# Patient Record
Sex: FEMALE | Race: WHITE | Employment: STUDENT | URBAN - METROPOLITAN AREA
[De-identification: names, ages, dates, MRNs, and addresses within clinical notes are randomized per-mention and may not be internally consistent; named-entity substitution may affect disease eponyms.]

---

## 2017-01-23 ENCOUNTER — TELEPHONE (OUTPATIENT)
Dept: INTERNAL MEDICINE CLINIC | Facility: CLINIC | Age: 27
End: 2017-01-23

## 2017-01-23 NOTE — TELEPHONE ENCOUNTER
Pt called in stating her insurance company is requesting office notes from her visit on 8/8. Pt states she needs the documents including tax ID, diagnosis, etc.  Pt is asking if they can be mailed to her since she is away at school in [facility].   Mailing a

## 2017-05-16 ENCOUNTER — TELEPHONE (OUTPATIENT)
Dept: INTERNAL MEDICINE CLINIC | Facility: CLINIC | Age: 27
End: 2017-05-16

## 2017-05-16 NOTE — TELEPHONE ENCOUNTER
Pt states Dr Nereida Hdez wanted her to have labs done before her next prescription refills  Is out of [place]- wont be home until Aug    Questioning if can get lab orders to be done in [place]?   If yes will provide lab information on callback

## 2017-05-16 NOTE — TELEPHONE ENCOUNTER
TANYA- pt has labs for [facility] in the system from Dec 2016-states she was unable to get them done as she lives out of state in [facility]. She will be back in August for a FU appt with you.  Can she have the labs done out of state ( faxed from our office ) or sh

## 2017-05-18 NOTE — TELEPHONE ENCOUNTER
Message was relayed on voice mail that labs can be gotten out of state. Pt was asked to return call if order of labs need to be faxed or mailed and to bring a copy of test results with her upon her return to see PCP.

## 2017-05-22 NOTE — TELEPHONE ENCOUNTER
Pt returning phone call from the nurse. Pt requesting to have the order mailed to her home. .. please advise

## 2017-06-28 RX ORDER — CLONAZEPAM 0.5 MG/1
TABLET ORAL
Qty: 30 TABLET | Refills: 2 | Status: SHIPPED | OUTPATIENT
Start: 2017-06-28 | End: 2017-08-11

## 2017-06-28 RX ORDER — ZOLPIDEM TARTRATE 5 MG/1
TABLET ORAL
Qty: 30 TABLET | Refills: 2 | Status: SHIPPED | OUTPATIENT
Start: 2017-06-28 | End: 2017-08-11

## 2017-06-29 RX ORDER — ZOLPIDEM TARTRATE 5 MG/1
TABLET ORAL
Qty: 30 TABLET | OUTPATIENT
Start: 2017-06-29

## 2017-06-29 RX ORDER — CLONAZEPAM 0.5 MG/1
TABLET ORAL
Qty: 30 TABLET | OUTPATIENT
Start: 2017-06-29

## 2017-08-01 ENCOUNTER — TELEPHONE (OUTPATIENT)
Dept: INTERNAL MEDICINE CLINIC | Facility: CLINIC | Age: 27
End: 2017-08-01

## 2017-08-01 NOTE — TELEPHONE ENCOUNTER
Pt stts she will be out of medication by the 7th but pharmacy will not dispense medication until the 8 th of Aug,     Pt asking for an early refill. Please advise.        ZOLPIDEM TARTRATE 5 MG Oral Tab

## 2017-08-02 NOTE — TELEPHONE ENCOUNTER
Pt is filling rx for Zolpidem in [facility] in [place], I called to clarify. Pt last picked up rx on July 12th for 30 tablets.           Disp Refills Start End    ZOLPIDEM TARTRATE 5 MG Oral Tab 30 tablet 2 6/28/2017     Sig: TAKE 1 TABLET BY MOUTH IN THE AT BED

## 2017-08-02 NOTE — TELEPHONE ENCOUNTER
Pt called back and stated that she does not need zolpidem 5 mg. She stated that she was seeing the label wrong. The pharmacy filled the Rx on 7/8 but she picked up on 7/12. Pt stated that she does not need this medication. She will have enough. Thanks

## 2017-08-11 ENCOUNTER — LAB ENCOUNTER (OUTPATIENT)
Dept: LAB | Facility: HOSPITAL | Age: 27
End: 2017-08-11
Attending: INTERNAL MEDICINE
Payer: COMMERCIAL

## 2017-08-11 DIAGNOSIS — G47.00 INSOMNIA, UNSPECIFIED TYPE: ICD-10-CM

## 2017-08-11 DIAGNOSIS — F41.1 ANXIETY STATE: ICD-10-CM

## 2017-08-11 LAB
ALBUMIN SERPL BCP-MCNC: 4.4 G/DL (ref 3.5–4.8)
ALBUMIN/GLOB SERPL: 1.7 {RATIO} (ref 1–2)
ALP SERPL-CCNC: 33 U/L (ref 32–100)
ALT SERPL-CCNC: 13 U/L (ref 14–54)
ANION GAP SERPL CALC-SCNC: 7 MMOL/L (ref 0–18)
AST SERPL-CCNC: 20 U/L (ref 15–41)
BASOPHILS # BLD: 0.1 K/UL (ref 0–0.2)
BASOPHILS NFR BLD: 1 %
BILIRUB SERPL-MCNC: 0.8 MG/DL (ref 0.3–1.2)
BUN SERPL-MCNC: 9 MG/DL (ref 8–20)
BUN/CREAT SERPL: 10.5 (ref 10–20)
CALCIUM SERPL-MCNC: 9.3 MG/DL (ref 8.5–10.5)
CHLORIDE SERPL-SCNC: 106 MMOL/L (ref 95–110)
CO2 SERPL-SCNC: 25 MMOL/L (ref 22–32)
CREAT SERPL-MCNC: 0.86 MG/DL (ref 0.5–1.5)
EOSINOPHIL # BLD: 0.1 K/UL (ref 0–0.7)
EOSINOPHIL NFR BLD: 2 %
ERYTHROCYTE [DISTWIDTH] IN BLOOD BY AUTOMATED COUNT: 12.8 % (ref 11–15)
GLOBULIN PLAS-MCNC: 2.6 G/DL (ref 2.5–3.7)
GLUCOSE SERPL-MCNC: 97 MG/DL (ref 70–99)
HCT VFR BLD AUTO: 41.9 % (ref 35–48)
HGB BLD-MCNC: 14.5 G/DL (ref 12–16)
LYMPHOCYTES # BLD: 2.4 K/UL (ref 1–4)
LYMPHOCYTES NFR BLD: 35 %
MCH RBC QN AUTO: 30.9 PG (ref 27–32)
MCHC RBC AUTO-ENTMCNC: 34.7 G/DL (ref 32–37)
MCV RBC AUTO: 89.1 FL (ref 80–100)
MONOCYTES # BLD: 0.7 K/UL (ref 0–1)
MONOCYTES NFR BLD: 10 %
NEUTROPHILS # BLD AUTO: 3.5 K/UL (ref 1.8–7.7)
NEUTROPHILS NFR BLD: 51 %
OSMOLALITY UR CALC.SUM OF ELEC: 285 MOSM/KG (ref 275–295)
PLATELET # BLD AUTO: 310 K/UL (ref 140–400)
PMV BLD AUTO: 7.8 FL (ref 7.4–10.3)
POTASSIUM SERPL-SCNC: 3.7 MMOL/L (ref 3.3–5.1)
PROT SERPL-MCNC: 7 G/DL (ref 5.9–8.4)
RBC # BLD AUTO: 4.7 M/UL (ref 3.7–5.4)
SODIUM SERPL-SCNC: 138 MMOL/L (ref 136–144)
TSH SERPL-ACNC: 1.21 UIU/ML (ref 0.45–5.33)
WBC # BLD AUTO: 6.9 K/UL (ref 4–11)

## 2017-08-11 PROCEDURE — 80053 COMPREHEN METABOLIC PANEL: CPT

## 2017-08-11 PROCEDURE — 84443 ASSAY THYROID STIM HORMONE: CPT

## 2017-08-11 PROCEDURE — 36415 COLL VENOUS BLD VENIPUNCTURE: CPT

## 2017-08-11 PROCEDURE — 85025 COMPLETE CBC W/AUTO DIFF WBC: CPT

## 2017-08-11 NOTE — ASSESSMENT & PLAN NOTE
Severe anxiety disorder, stable on clonazepam at 0.5 mg 1 tablet at bedtime. She has not had any increase in the dose of medications thus far. Continue on the same dose of medications as well have patient follow-up every 6 months.   She is due for recheck

## 2017-08-11 NOTE — PATIENT INSTRUCTIONS
Problem List Items Addressed This Visit        Unprioritized    Anxiety state - Primary     Severe anxiety disorder, stable on clonazepam at 0.5 mg 1 tablet at bedtime. She has not had any increase in the dose of medications thus far.   Continue on the gayathri

## 2017-08-11 NOTE — PROGRESS NOTES
HPI:    Patient ID: Dinorah Younger is a 28year old female. Anxiety   This is a chronic problem. The current episode started more than 1 year ago. The problem occurs constantly. The problem has been waxing and waning.  Pertinent negatives include no a dry.   Nursing note and vitals reviewed. ASSESSMENT/PLAN:     Problem List Items Addressed This Visit        Unprioritized    Anxiety state - Primary     Severe anxiety disorder, stable on clonazepam at 0.5 mg 1 tablet at bedtime.   She has not BEDTIME AS NEEDED      Norethin Ace-Eth Estrad-FE (JUNEL FE 1/20) 1-20 MG-MCG Oral Tab 28 tablet 5      Sig: Take 1 tablet by mouth once daily.       Zolpidem Tartrate 5 MG Oral Tab 30 tablet 5      Sig: TAKE 1 TABLET BY MOUTH IN THE AT BEDTIME           Im

## 2017-08-29 ENCOUNTER — NURSE TRIAGE (OUTPATIENT)
Dept: OTHER | Age: 27
End: 2017-08-29

## 2017-08-29 NOTE — TELEPHONE ENCOUNTER
Reason for Disposition  • Insomnia interferes with work or school     Pt states that she has been on medication for insomnia for the past 5 years. Over the past 3 weeks (since LOV) but has had increasing difficulty staying asleep.   She has been taking m

## 2017-08-29 NOTE — TELEPHONE ENCOUNTER
I understand that the patient has problems with sleep and anxiety. I am not comfortable with increasing doses of medication at this time. Consider nonpharmacological means for management of insomnia and anxiety.   Continue on the same dose of clonazepam a

## 2017-08-30 NOTE — TELEPHONE ENCOUNTER
Message per Dr. Bell Simple reviewed with pt. Pt verbalized understanding.  She will try talking to a therapist 1st.

## 2018-02-28 RX ORDER — NORETHINDRONE ACETATE AND ETHINYL ESTRADIOL AND FERROUS FUMARATE 1MG-20(21)
KIT ORAL
Qty: 28 TABLET | Refills: 5 | Status: SHIPPED | OUTPATIENT
Start: 2018-02-28 | End: 2018-08-26

## 2018-08-27 RX ORDER — NORETHINDRONE ACETATE AND ETHINYL ESTRADIOL AND FERROUS FUMARATE 1MG-20(21)
KIT ORAL
Qty: 28 TABLET | Refills: 12 | Status: SHIPPED | OUTPATIENT
Start: 2018-08-27 | End: 2019-11-20

## 2019-11-21 RX ORDER — NORETHINDRONE ACETATE AND ETHINYL ESTRADIOL AND FERROUS FUMARATE 1MG-20(21)
KIT ORAL
Qty: 84 TABLET | Refills: 1 | Status: SHIPPED | OUTPATIENT
Start: 2019-11-21

## 2019-11-21 NOTE — TELEPHONE ENCOUNTER
Please review; protocol failed.   Gynecology Medications  Protocol Criteria:  · Appointment scheduled in the past 12 months or the next 3 months  · Pap smear in the past 12 months  · Pap smear WNL manually verified  Recent Outpatient Visits            Carlene smith

## 2021-11-09 ENCOUNTER — OFFICE VISIT (OUTPATIENT)
Dept: INTERNAL MEDICINE CLINIC | Facility: CLINIC | Age: 31
End: 2021-11-09
Payer: COMMERCIAL

## 2021-11-09 VITALS
DIASTOLIC BLOOD PRESSURE: 82 MMHG | BODY MASS INDEX: 16.04 KG/M2 | WEIGHT: 99.81 LBS | HEART RATE: 74 BPM | HEIGHT: 66 IN | SYSTOLIC BLOOD PRESSURE: 118 MMHG

## 2021-11-09 DIAGNOSIS — Z01.419 WOMEN'S ANNUAL ROUTINE GYNECOLOGICAL EXAMINATION: Primary | ICD-10-CM

## 2021-11-09 DIAGNOSIS — Z00.00 ROUTINE PHYSICAL EXAMINATION: ICD-10-CM

## 2021-11-09 DIAGNOSIS — G47.00 INSOMNIA, UNSPECIFIED TYPE: ICD-10-CM

## 2021-11-09 DIAGNOSIS — F41.1 ANXIETY STATE: ICD-10-CM

## 2021-11-09 PROCEDURE — 90471 IMMUNIZATION ADMIN: CPT | Performed by: NURSE PRACTITIONER

## 2021-11-09 PROCEDURE — 3008F BODY MASS INDEX DOCD: CPT | Performed by: NURSE PRACTITIONER

## 2021-11-09 PROCEDURE — 90686 IIV4 VACC NO PRSV 0.5 ML IM: CPT | Performed by: NURSE PRACTITIONER

## 2021-11-09 PROCEDURE — 3074F SYST BP LT 130 MM HG: CPT | Performed by: NURSE PRACTITIONER

## 2021-11-09 PROCEDURE — 3079F DIAST BP 80-89 MM HG: CPT | Performed by: NURSE PRACTITIONER

## 2021-11-09 PROCEDURE — 99385 PREV VISIT NEW AGE 18-39: CPT | Performed by: NURSE PRACTITIONER

## 2021-11-09 RX ORDER — SERTRALINE HYDROCHLORIDE 100 MG/1
TABLET, FILM COATED ORAL
Qty: 45 TABLET | Refills: 0 | Status: SHIPPED | OUTPATIENT
Start: 2021-11-09 | End: 2022-02-07

## 2021-11-09 RX ORDER — TRAZODONE HYDROCHLORIDE 50 MG/1
50 TABLET ORAL NIGHTLY
Qty: 30 TABLET | Refills: 1 | Status: SHIPPED | OUTPATIENT
Start: 2021-11-09

## 2021-11-09 NOTE — ASSESSMENT & PLAN NOTE
Has had problems with chronic insomnia. She has had an extensive workup done in the past  including sleep studies as well as evaluation by pulmonology. Trazodone is working for her.     Refilled  Trazodone 50mg po nightly

## 2021-11-09 NOTE — ASSESSMENT & PLAN NOTE
Anxiety disorder    Patient was given sertraline and this is worked for her. She is requesting refills on this medication since she moved back to this area.     Sertraline 100 mg p.o. daily

## 2021-11-09 NOTE — PROGRESS NOTES
HPI:    Patient ID: Magan Mendoza is a 27year old female. Sexually active with one partner.     Immunization History  Administered            Date(s) Administered    Covid-19 Vaccine Moderna 100 mcg/0.5 ml                          01/08/2021 02/05/20 Smokeless tobacco: Never Used    Substance and Sexual Activity      Alcohol use:  Yes        Alcohol/week: 0.0 standard drinks        Comment: 1 drink occasionally      Drug use: No    Other Topics      Concerns:        Caffeine Concern: Yes          coffee No oropharyngeal exudate or posterior oropharyngeal erythema. Eyes:      General:         Right eye: No discharge. Left eye: No discharge. Pupils: Pupils are equal, round, and reactive to light.    Cardiovascular:      Rate and Rhythm: Normal Lab Results   Component Value Date    GLU 97 08/11/2017    BUN 9 08/11/2017    BUNCREA 10.5 08/11/2017    CREATSERUM 0.86 08/11/2017    ANIONGAP 7 08/11/2017    GFRNAA >60 08/11/2017    GFRAA >60 08/11/2017    CA 9.3 08/11/2017    OSMOCALC 285 08/11/2 months and older 0.5 ml PFS [34349]      Meds This Visit:  Requested Prescriptions     Signed Prescriptions Disp Refills   • traZODone 50 MG Oral Tab 30 tablet 1     Sig: Take 1 tablet (50 mg total) by mouth nightly.    • sertraline 100 MG Oral Tab 45 table

## 2021-12-07 ENCOUNTER — OFFICE VISIT (OUTPATIENT)
Dept: INTERNAL MEDICINE CLINIC | Facility: CLINIC | Age: 31
End: 2021-12-07
Payer: COMMERCIAL

## 2021-12-07 VITALS
WEIGHT: 98.63 LBS | BODY MASS INDEX: 15.85 KG/M2 | HEART RATE: 78 BPM | DIASTOLIC BLOOD PRESSURE: 71 MMHG | SYSTOLIC BLOOD PRESSURE: 103 MMHG | HEIGHT: 66 IN

## 2021-12-07 DIAGNOSIS — R76.12 POSITIVE QUANTIFERON-TB GOLD TEST: Primary | ICD-10-CM

## 2021-12-07 PROCEDURE — 99213 OFFICE O/P EST LOW 20 MIN: CPT | Performed by: NURSE PRACTITIONER

## 2021-12-07 PROCEDURE — 3078F DIAST BP <80 MM HG: CPT | Performed by: NURSE PRACTITIONER

## 2021-12-07 PROCEDURE — 3074F SYST BP LT 130 MM HG: CPT | Performed by: NURSE PRACTITIONER

## 2021-12-07 PROCEDURE — 3008F BODY MASS INDEX DOCD: CPT | Performed by: NURSE PRACTITIONER

## 2021-12-07 NOTE — PROGRESS NOTES
HPI:    Patient ID: Kimberly Millard is a 27year old female. HPI Positive Quanterferon test. Negative Chest X-[facility] completed at [facility]. She brought report and it is negative. Patient was told no treatment necessary.  She stated she needs my signature st disturbance. The patient is not nervous/anxious. Current Outpatient Medications   Medication Sig Dispense Refill   • traZODone 50 MG Oral Tab Take 1 tablet (50 mg total) by mouth nightly.  30 tablet 1   • sertraline 100 MG Oral Tab Patient to Coordination: Coordination normal.      Gait: Gait normal.   Psychiatric:         Mood and Affect: Mood normal.         Behavior: Behavior normal.         Thought Content:  Thought content normal.         Judgment: Judgment normal.       /71 (BP Locat She states she is in good health. This maybe a false positive test but needs further evaluation. Forms signed for no treatment but with further investigation should need further evaluation.     Plan  Patient called she should follow up with pulmonologist f

## 2021-12-07 NOTE — ASSESSMENT & PLAN NOTE
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  Chest X-ray is negative. She is getting  this weekend. She has no fever, sweats, SOB, and no cough. She states she is in good health.  This maybe a false positiv

## 2022-01-02 ENCOUNTER — TELEPHONE (OUTPATIENT)
Dept: INTERNAL MEDICINE CLINIC | Facility: CLINIC | Age: 32
End: 2022-01-02

## 2022-01-03 NOTE — TELEPHONE ENCOUNTER
Left message for patient to call office back and to follow up with pulmonologist . Kassie Hinojosa also sent via [employer] and mailed to patients home address listed in EMR.

## 2022-02-07 RX ORDER — SERTRALINE HYDROCHLORIDE 100 MG/1
TABLET, FILM COATED ORAL
Qty: 90 TABLET | Refills: 1 | Status: SHIPPED | OUTPATIENT
Start: 2022-02-07

## 2022-02-07 NOTE — TELEPHONE ENCOUNTER
Refill passed per [address] protocol. Please see drug interaction with Trazodone.        Requested Prescriptions   Pending Prescriptions Disp Refills    sertraline 100 MG Oral Tab 45 tablet 0     Sig: Patient to take one tablet daily        Psychiatric Non-Scheduled (Anti-Anxiety) Passed - 2/7/2022 10:57 AM        Passed - Appointment in last 6 or next 3 months               Recent Outpatient Visits              2 months ago Positive QuantiFERON-TB Gold test    Monica Briones [place], [employer] Visit    3 months ago [employer] annual routine gynecological examination    [address], [address], Elena Anderson, [employer]    4 years ago Yari Turk, [address], MD [place]    Office Visit    5 years ago Jody Forrester MD    Office Visit    5 years ago Insomnia, unspecified type    [address], [place]    Office Visit

## 2022-03-01 RX ORDER — TRAZODONE HYDROCHLORIDE 50 MG/1
50 TABLET ORAL NIGHTLY
Qty: 30 TABLET | Refills: 5 | Status: CANCELLED | OUTPATIENT
Start: 2022-03-01

## 2022-03-01 NOTE — TELEPHONE ENCOUNTER
[facility]. Requested Prescriptions   Pending Prescriptions Disp Refills    traZODone 50 MG Oral Tab 30 tablet 1     Sig: Take 1 tablet (50 mg total) by mouth nightly.         Psychiatric Non-Scheduled (Anti-Anxiety) Passed - 3/1/2022  9:15 AM        Passed - Appointment in last 6 or next 3 months              Recent Outpatient Visits              2 months ago Positive QuantiFERON-TB Gold test    [facility], [place], Mikala Anderson, [employer] Visit    3 months ago [employer] annual routine gynecological examination    [facility], [place], Inder Anderson [employer]    4 years ago [address] Leigh Cottrell MD    Office Visit    5 years ago [address] Toan Welsh MD    Office Visit    5 years ago Insomnia, unspecified type    Leigh Montenegro MD    Office Visit

## 2022-03-01 NOTE — TELEPHONE ENCOUNTER
Please advise on refill of trazadone. It passes protocol, but warning of interaction with sertraline.

## 2022-03-02 RX ORDER — TRAZODONE HYDROCHLORIDE 50 MG/1
50 TABLET ORAL NIGHTLY
Qty: 30 TABLET | Refills: 1 | Status: SHIPPED | OUTPATIENT
Start: 2022-03-02

## 2022-03-30 ENCOUNTER — PATIENT MESSAGE (OUTPATIENT)
Dept: INTERNAL MEDICINE CLINIC | Facility: CLINIC | Age: 32
End: 2022-03-30

## 2022-03-30 NOTE — TELEPHONE ENCOUNTER
From: Louise Kirkland  To: AKASH Gonzalez  Sent: 3/30/2022 3:02 PM CDT  Subject: Birth Control    Hi Summer Soriano,   I received a message stating that my birth control cannot be refilled through MyChart at this time. I am wondering how I can get this medication refilled? I will be out starting next week. Thank you so much!   Giovanni Valenzuela

## 2022-03-30 NOTE — TELEPHONE ENCOUNTER
Please review. Protocol failed/No protocol        Requested Prescriptions   Pending Prescriptions Disp Refills    Norethin Ace-Eth Estrad-FE (JUNEL FE 1/20) 1-20 MG-MCG Oral Tab 84 tablet 1     Sig: Take 1 tablet by mouth daily.         Gynecology Medication Protocol Failed - 3/30/2022  4:16 PM        Failed - Pass dependent on manual look-up of last PAP and patient compliance with PAP follow up recommendations        Passed - Appointment in past 12 or next 3 months                  Recent Outpatient Visits              3 months ago Positive QuantiFERON-TB Gold test    [address], [address], Mikala Anderson, [employer] Visit    4 months ago [employer] annual routine gynecological examination    [address], [address], Inder Anderson, [employer]    4 years ago [address], Klaus Quintero MD    Office Visit    5 years ago [address], [place], Necia Olszewski, MD    Office Visit    5 years ago Insomnia, unspecified type    [address], [address] Klaus Fisher MD    Office Visit

## 2022-03-31 RX ORDER — NORETHINDRONE ACETATE AND ETHINYL ESTRADIOL 1MG-20(21)
1 KIT ORAL DAILY
Qty: 84 TABLET | Refills: 1 | Status: SHIPPED | OUTPATIENT
Start: 2022-03-31

## 2022-04-04 RX ORDER — TRAZODONE HYDROCHLORIDE 50 MG/1
TABLET ORAL
Qty: 30 TABLET | Refills: 1 | OUTPATIENT
Start: 2022-04-04

## 2022-04-04 NOTE — TELEPHONE ENCOUNTER
Denied, Per last refill:      Sun Henry, AKASH  to Raúl Patient          2:11 PM  Great idea to wean off. This should be done over a two month periods. HAIR Valladares  Working with Ephraim Rubinstein     Last read by Nisha Ulrich at 3:45 PM on 3/2/2022. MM    7:31 AM  Raphael Samaniego RN routed this conversation to AKASH George     March 1, 2022    Raúl Patient  to AKASH George          8:36 PM  I am sorry! I had a crazy day at work. I have been doing very well on the Zoloft and Trazodone and am not experiencing any side effects or concerns. I am actually looking to wean off the Zoloft and have been taking 50 mg Zoloft (instead of 100 mg). I think I am doing well with my anxiety and do not feel I need to be on the Zoloft anymore. I can call back tomorrow if you want to talk further.

## 2022-04-13 RX ORDER — TRAZODONE HYDROCHLORIDE 50 MG/1
50 TABLET ORAL NIGHTLY
Qty: 30 TABLET | Refills: 1 | OUTPATIENT
Start: 2022-04-13

## 2022-05-16 RX ORDER — TRAZODONE HYDROCHLORIDE 50 MG/1
50 TABLET ORAL NIGHTLY
Qty: 30 TABLET | Refills: 1 | OUTPATIENT
Start: 2022-05-16

## 2022-05-16 NOTE — TELEPHONE ENCOUNTER
Refill passed per [address] [place] protocol.     Requested Prescriptions   Pending Prescriptions Disp Refills    TRAZODONE 50 MG Oral Tab [Pharmacy Med Name: TRAZODONE 50 MG TABLET] 30 tablet 1     Sig: TAKE 1 TABLET BY MOUTH EVERY DAY AT NIGHT        Psychiatric Non-Scheduled (Anti-Anxiety) Passed - 5/16/2022  8:31 AM        Passed - Appointment in last 6 or next 3 months              Recent Outpatient Visits              5 months ago Positive QuantiFERON-TB Gold test    [address] [place], [place], [employer] Visit    6 months ago [employer] annual routine gynecological examination    [address] [place], [place], [employer]    4 years ago [address] Sherwin Bee Sheridan Moulds, MD    Office Visit    5 years ago [address] Sherwin Joseph Sheridan Moulds, MD    Office Visit    5 years ago Insomnia, unspecified type    [address], [address] Christine Fisher MD    Office Visit

## 2022-08-29 RX ORDER — SERTRALINE HYDROCHLORIDE 100 MG/1
TABLET, FILM COATED ORAL
Qty: 90 TABLET | Refills: 0 | Status: SHIPPED | OUTPATIENT
Start: 2022-08-29

## 2022-08-30 NOTE — TELEPHONE ENCOUNTER
Please review. Protocol Failed or has no Protocol.     Requested Prescriptions   Pending Prescriptions Disp Refills    SERTRALINE 100 MG Oral Tab [Pharmacy Med Name: SERTRALINE  MG TABLET] 90 tablet 1     Sig: TAKE ONE TABLET BY MOUTH DAILY        Psychiatric Non-Scheduled (Anti-Anxiety) Failed - 8/29/2022  8:29 PM        Failed - In person appointment or virtual visit in the past 6 mos or appointment in next 3 mos       Recent Outpatient Visits              8 months ago Positive QuantiFERON-TB Gold test    [address] [place], [address], [place], [place], APRN    Office Visit    9 months ago [employer] annual routine gynecological examination    [address] [place] [place], [address], [place], [place], APRN    Office Visit    5 years ago [address], [place]    Office Visit    5 years ago [address] Jennetta Duos, MD    Office Visit    6 years ago Insomnia, unspecified type    Bob Treviño MD    Office Visit                      Recent Outpatient Visits              8 months ago Positive QuantiFERON-TB Gold test    [address] [place], [address], [place], Mikala, APRN    Office Visit    9 months ago [employer] annual routine gynecological examination    [address] [place], [address], [place], [place], APRN    Office Visit    5 years ago [address], [place]    Office Visit    5 years ago [address] Jennetta Duos, MD    Office Visit    6 years ago Insomnia, unspecified type    [address] [place], [address] Jennetta Duos, MD    Office Visit

## 2022-09-27 RX ORDER — NORETHINDRONE ACETATE AND ETHINYL ESTRADIOL 1MG-20(21)
1 KIT ORAL DAILY
Qty: 84 TABLET | Refills: 1 | OUTPATIENT
Start: 2022-09-27

## 2022-09-27 NOTE — TELEPHONE ENCOUNTER
Please review. Protocol failed / No Protocol.     Requested Prescriptions   Pending Prescriptions Disp Refills    JUNEL FE 1/20 1-20 MG-MCG Oral Tab [Pharmacy Med Name: Freddy VALENTINE 1 MG-20 MCG TABLET] 84 tablet 1     Sig: TAKE 1 TABLET BY MOUTH EVERY DAY        Gynecology Medication Protocol Failed - 9/26/2022  6:47 PM        Failed - Pass dependent on manual look-up of last PAP and patient compliance with PAP follow up recommendations        Passed - In person appointment or virtual visit in the past 12 mos or appointment in next 3 mos       Recent Outpatient Visits              9 months ago Positive QuantiFERON-TB Gold test    [address] [place], [address], [facility], APRN    Office Visit    10 months ago [facility] annual routine gynecological examination    [address], [address], [facility], APRN    Office Visit    5 years ago [address] [facility], [place]    Office Visit    5 years ago [address] [facility]    Office Visit    6 years ago Insomnia, unspecified type    Fernanda Rich MD    Office Visit                        Recent Outpatient Visits              9 months ago Positive QuantiFERON-TB Gold test    [address] [place], [address], [facility], APRN    Office Visit    10 months ago [facility] annual routine gynecological examination    [address] [place], [address], [facility], APRN    Office Visit    5 years ago [address], [place], Romelia Rice MD    Office Visit    5 years ago [address] [facility]    Office Visit    6 years ago Insomnia, unspecified type    [address] [place], [address] [facility], Romelia Rice MD    Office Visit

## 2022-09-27 NOTE — TELEPHONE ENCOUNTER
Please review. Protocol failed / No Protocol. Last pap 2020  Requested Prescriptions   Pending Prescriptions Disp Refills    Norethin Ace-Eth Estrad-FE (JUNEL FE 1/20) 1-20 MG-MCG Oral Tab 84 tablet 1     Sig: Take 1 tablet by mouth daily.         Gynecology Medication Protocol Failed - 9/26/2022 12:06 PM        Failed - Pass dependent on manual look-up of last PAP and patient compliance with PAP follow up recommendations        Passed - In person appointment or virtual visit in the past 12 mos or appointment in next 3 mos       Recent Outpatient Visits              9 months ago Positive QuantiFERON-TB Gold test    [address] [place], [address], [facility] Claudia Louder, APRN    Office Visit    10 months ago [facility] annual routine gynecological examination    [address] [place], [address], [facility] Claudia Louder, [employer] Visit    5 years ago [address] [facility], [place]    Office Visit    5 years ago [address] [facility]    Office Visit    6 years ago Insomnia, unspecified type    Brenda Gardner MD    Office Visit                        Recent Outpatient Visits              9 months ago Positive QuantiFERON-TB Gold test    [address] [place], [address], [place], Mikala, APRN    Office Visit    10 months ago [facility] annual routine gynecological examination    [address] [place], [address], [place], Mikala, APRN    Office Visit    5 years ago [address] [place] [address], [place]    Office Visit    5 years ago [address] [facility]    Office Visit    6 years ago Insomnia, unspecified type    [address] [place], [address] [place] Fadi Courtney MD    Office Visit

## 2025-06-20 ENCOUNTER — LAB ENCOUNTER (OUTPATIENT)
Dept: LAB | Age: 35
End: 2025-06-20
Attending: Nurse Practitioner
Payer: COMMERCIAL

## 2025-06-20 ENCOUNTER — OFFICE VISIT (OUTPATIENT)
Dept: INTERNAL MEDICINE CLINIC | Facility: CLINIC | Age: 35
End: 2025-06-20

## 2025-06-20 VITALS
HEIGHT: 66 IN | WEIGHT: 110 LBS | DIASTOLIC BLOOD PRESSURE: 77 MMHG | OXYGEN SATURATION: 97 % | BODY MASS INDEX: 17.68 KG/M2 | HEART RATE: 80 BPM | SYSTOLIC BLOOD PRESSURE: 115 MMHG

## 2025-06-20 DIAGNOSIS — Z13.220 LIPID SCREENING: ICD-10-CM

## 2025-06-20 DIAGNOSIS — G25.81 RESTLESS LEGS: ICD-10-CM

## 2025-06-20 DIAGNOSIS — G47.00 INSOMNIA, UNSPECIFIED TYPE: ICD-10-CM

## 2025-06-20 DIAGNOSIS — Z13.29 THYROID DISORDER SCREEN: ICD-10-CM

## 2025-06-20 DIAGNOSIS — Z00.00 WELLNESS EXAMINATION: ICD-10-CM

## 2025-06-20 DIAGNOSIS — Z00.00 WELLNESS EXAMINATION: Primary | ICD-10-CM

## 2025-06-20 DIAGNOSIS — Z13.0 SCREENING FOR DEFICIENCY ANEMIA: ICD-10-CM

## 2025-06-20 DIAGNOSIS — F41.1 ANXIETY STATE: ICD-10-CM

## 2025-06-20 LAB
ALBUMIN SERPL-MCNC: 4.7 G/DL (ref 3.2–4.8)
ALBUMIN/GLOB SERPL: 2.1 {RATIO} (ref 1–2)
ALP LIVER SERPL-CCNC: 51 U/L (ref 37–98)
ALT SERPL-CCNC: 7 U/L (ref 10–49)
ANION GAP SERPL CALC-SCNC: 5 MMOL/L (ref 0–18)
AST SERPL-CCNC: 16 U/L (ref ?–34)
BASOPHILS # BLD AUTO: 0.11 X10(3) UL (ref 0–0.2)
BASOPHILS NFR BLD AUTO: 1.6 %
BILIRUB SERPL-MCNC: 0.3 MG/DL (ref 0.3–1.2)
BUN BLD-MCNC: 12 MG/DL (ref 9–23)
BUN/CREAT SERPL: 13.3 (ref 10–20)
CALCIUM BLD-MCNC: 9.4 MG/DL (ref 8.7–10.4)
CHLORIDE SERPL-SCNC: 106 MMOL/L (ref 98–112)
CHOLEST SERPL-MCNC: 162 MG/DL (ref ?–200)
CO2 SERPL-SCNC: 26 MMOL/L (ref 21–32)
CREAT BLD-MCNC: 0.9 MG/DL (ref 0.55–1.02)
DEPRECATED HBV CORE AB SER IA-ACNC: 5 NG/ML (ref 50–306)
DEPRECATED RDW RBC AUTO: 40.2 FL (ref 35.1–46.3)
EGFRCR SERPLBLD CKD-EPI 2021: 85 ML/MIN/1.73M2 (ref 60–?)
EOSINOPHIL # BLD AUTO: 0.25 X10(3) UL (ref 0–0.7)
EOSINOPHIL NFR BLD AUTO: 3.5 %
ERYTHROCYTE [DISTWIDTH] IN BLOOD BY AUTOMATED COUNT: 13.4 % (ref 11–15)
FASTING PATIENT LIPID ANSWER: NO
FASTING STATUS PATIENT QL REPORTED: NO
GLOBULIN PLAS-MCNC: 2.2 G/DL (ref 2–3.5)
GLUCOSE BLD-MCNC: 97 MG/DL (ref 70–99)
HCT VFR BLD AUTO: 37.5 % (ref 35–48)
HDLC SERPL-MCNC: 52 MG/DL (ref 40–59)
HGB BLD-MCNC: 12.2 G/DL (ref 12–16)
IMM GRANULOCYTES # BLD AUTO: 0.02 X10(3) UL (ref 0–1)
IMM GRANULOCYTES NFR BLD: 0.3 %
LDLC SERPL CALC-MCNC: 87 MG/DL (ref ?–100)
LYMPHOCYTES # BLD AUTO: 2.83 X10(3) UL (ref 1–4)
LYMPHOCYTES NFR BLD AUTO: 40.1 %
MAGNESIUM SERPL-MCNC: 2.2 MG/DL (ref 1.6–2.6)
MCH RBC QN AUTO: 26.7 PG (ref 26–34)
MCHC RBC AUTO-ENTMCNC: 32.5 G/DL (ref 31–37)
MCV RBC AUTO: 82.1 FL (ref 80–100)
MONOCYTES # BLD AUTO: 0.77 X10(3) UL (ref 0.1–1)
MONOCYTES NFR BLD AUTO: 10.9 %
NEUTROPHILS # BLD AUTO: 3.08 X10 (3) UL (ref 1.5–7.7)
NEUTROPHILS # BLD AUTO: 3.08 X10(3) UL (ref 1.5–7.7)
NEUTROPHILS NFR BLD AUTO: 43.6 %
NONHDLC SERPL-MCNC: 110 MG/DL (ref ?–130)
OSMOLALITY SERPL CALC.SUM OF ELEC: 284 MOSM/KG (ref 275–295)
PLATELET # BLD AUTO: 344 10(3)UL (ref 150–450)
POTASSIUM SERPL-SCNC: 3.7 MMOL/L (ref 3.5–5.1)
PROT SERPL-MCNC: 6.9 G/DL (ref 5.7–8.2)
RBC # BLD AUTO: 4.57 X10(6)UL (ref 3.8–5.3)
SODIUM SERPL-SCNC: 137 MMOL/L (ref 136–145)
TRIGL SERPL-MCNC: 131 MG/DL (ref 30–149)
TSI SER-ACNC: 0.93 UIU/ML (ref 0.55–4.78)
VLDLC SERPL CALC-MCNC: 21 MG/DL (ref 0–30)
WBC # BLD AUTO: 7.1 X10(3) UL (ref 4–11)

## 2025-06-20 PROCEDURE — 83735 ASSAY OF MAGNESIUM: CPT

## 2025-06-20 PROCEDURE — 82728 ASSAY OF FERRITIN: CPT

## 2025-06-20 PROCEDURE — 85025 COMPLETE CBC W/AUTO DIFF WBC: CPT

## 2025-06-20 PROCEDURE — 36415 COLL VENOUS BLD VENIPUNCTURE: CPT

## 2025-06-20 PROCEDURE — 84443 ASSAY THYROID STIM HORMONE: CPT

## 2025-06-20 PROCEDURE — 80061 LIPID PANEL: CPT

## 2025-06-20 PROCEDURE — 80053 COMPREHEN METABOLIC PANEL: CPT

## 2025-06-20 RX ORDER — SERTRALINE HYDROCHLORIDE 100 MG/1
TABLET, FILM COATED ORAL
Qty: 90 TABLET | Refills: 1 | Status: SHIPPED | OUTPATIENT
Start: 2025-06-20

## 2025-06-20 NOTE — PROGRESS NOTES
Subjective:   Shital Bobo is a 35 year old female who presents to Establish Care     History of Present Illness  Shital Bobo is a 35 year old female with insomnia and anxiety who presents to establish care for medication management and a routine physical exam.    She has a history of insomnia and anxiety, primarily related to sleep disturbances. She has been on sertraline for anxiety and trazodone for sleep for several years. An attempt to discontinue sertraline resulted in worsening insomnia, prompting her to resume the medication. Currently, she is on 50 mg of sertraline. Recent stressors, including a move and living with her parents temporarily, have exacerbated her anxiety and sleep issues. She experiences difficulty falling and staying asleep, often waking up at 2 or 3 AM and feeling exhausted. No unexplained weight loss, unusual bruising or bleeding, frequent abdominal pain, nausea, vomiting, or diarrhea.    She experiences restless leg syndrome, which began during her pregnancy and has persisted. It occurs most nights, particularly affecting her right leg, and is somewhat alleviated by elevating her legs against the wall. She has not pursued medication for this condition but is open to trying supplements if necessary. Her mother also has restless leg syndrome.    Her current medications include sertraline 50 mg, trazodone, and OCP. She has tried other medications in the past, such as Ambien and Klonopin, but found the current regimen most effective. She is currently using birth control and has regular menstrual cycles. She plans to try for another child in the future (around January of next year) but is currently preventing pregnancy.    She recently moved back to the area from [place] and is living with her parents. She has a [id number] daughter and works as a psychologist, currently in a virtual practice. Family history includes breast cancer in her grandmother, who is , but not  from breast cancer.    History/Other:    Chief Complaint Reviewed and Verified  No Further Nursing Notes to   Review  Tobacco Reviewed  Allergies Reviewed  Medications Reviewed    Problem List Reviewed  Medical History Reviewed  Surgical History   Reviewed  OB Status Reviewed  Family History Reviewed         Tobacco:  She has never smoked tobacco.    Current Medications[1]      Review of Systems:  Review of Systems  10 point review of systems otherwise negative with the exception of HPI and assessment and plan.    Objective:   /77   Pulse 80   Ht 5' 6\" (1.676 m)   Wt 110 lb (49.9 kg)   LMP 06/14/2025   SpO2 97%   BMI 17.75 kg/m²  Estimated body mass index is 17.75 kg/m² as calculated from the following:    Height as of this encounter: 5' 6\" (1.676 m).    Weight as of this encounter: 110 lb (49.9 kg).      Physical Exam  Vitals reviewed.   Constitutional:       Appearance: Normal appearance.   HENT:      Head: Normocephalic.      Right Ear: Tympanic membrane normal.      Left Ear: Tympanic membrane normal.      Nose: Nose normal.      Mouth/Throat:      Mouth: Mucous membranes are moist.      Pharynx: No posterior oropharyngeal erythema.   Eyes:      Extraocular Movements: Extraocular movements intact.      Conjunctiva/sclera: Conjunctivae normal.      Pupils: Pupils are equal, round, and reactive to light.   Neck:      Thyroid: No thyroid mass, thyromegaly or thyroid tenderness.   Cardiovascular:      Rate and Rhythm: Normal rate and regular rhythm.      Heart sounds: Normal heart sounds, S1 normal and S2 normal. No murmur heard.  Pulmonary:      Effort: Pulmonary effort is normal.      Breath sounds: Normal breath sounds.   Abdominal:      General: Bowel sounds are normal.      Palpations: Abdomen is soft.      Tenderness: There is no abdominal tenderness.   Musculoskeletal:      Cervical back: Normal range of motion.      Right lower leg: No edema.      Left lower leg: No edema.    Lymphadenopathy:      Cervical: No cervical adenopathy.      Upper Body:      Right upper body: No supraclavicular adenopathy.      Left upper body: No supraclavicular adenopathy.   Skin:     General: Skin is warm and dry.      Capillary Refill: Capillary refill takes less than 2 seconds.   Neurological:      General: No focal deficit present.      Mental Status: She is alert and oriented to person, place, and time.   Psychiatric:         Mood and Affect: Mood and affect normal.         Speech: Speech normal.         Assessment & Plan:   1. Wellness examination  - Comp Metabolic Panel (14); Future  - CBC With Differential With Platelet; Future  - Lipid Panel; Future  - TSH W Reflex To Free T4; Future    2. Lipid screening  - Lipid Panel; Future    3. Screening for deficiency anemia  - CBC With Differential With Platelet; Future    4. Thyroid disorder screen  - TSH W Reflex To Free T4; Future    5. Anxiety state  - sertraline 100 MG Oral Tab; TAKE ONE TABLET BY MOUTH DAILY  Dispense: 90 tablet; Refill: 1    6. Insomnia, unspecified type    7. Restless legs  - Ferritin [E]; Future  - Magnesium [E]; Future    Assessment & Plan  Health maintenance  - Annual screening labs  - Aim for 30 minutes of moderate to vigorous activity 4-5 times per week. Healthy diet plenty of leafy green vegetables, lean protein, fruits, whole grains.  - Biannual dental exams and annual vision exam.  - Return in approximately 1 year for annual physical.    Insomnia  Chronic insomnia exacerbated by stress and early waking. Previously controlled with sertraline and trazodone.  - Increase sertraline to 100 mg daily.  - Continue trazodone for sleep.  - Emphasize consistent bedtime routine.  - Consider magnesium supplement.    Anxiety  Anxiety linked to sleep disturbances and stress. Managed with sertraline. Agreed with her request to increase dose.  - Increase sertraline to 100 mg daily.    Restless Legs Syndrome  Persistent restless legs  syndrome since pregnancy, affecting sleep. Possible iron deficiency or electrolyte imbalance.  - Order ferritin and magnesium levels.  - Recommend magnesium supplement.  - Discuss leg elevation and stretching before bedtime.          Return in 1 year (on 6/20/2026).    AKASH Barker, 6/20/2025, 1:12 PM        [1]   Current Outpatient Medications   Medication Sig Dispense Refill    sertraline 100 MG Oral Tab TAKE ONE TABLET BY MOUTH DAILY 90 tablet 1    Norethin Ace-Eth Estrad-FE (JUNEL FE 1/20) 1-20 MG-MCG Oral Tab Take 1 tablet by mouth daily. 84 tablet 1    traZODone 50 MG Oral Tab Take 1 tablet (50 mg total) by mouth nightly. 30 tablet 1

## 2025-06-20 NOTE — PATIENT INSTRUCTIONS
- Annual screening labs  - Aim for 30 minutes of moderate to vigorous activity 4-5 times per week. Healthy diet plenty of leafy green vegetables, lean protein, fruits, whole grains.  - Biannual dental exams and annual vision exam.  - Return in approximately 1 year for annual physical.

## 2025-06-20 NOTE — PROGRESS NOTES
The following individual(s) verbally consented to be recorded using ambient AI listening technology and understand that they can each withdraw their consent to this listening technology at any point by asking the clinician to turn off or pause the recording:    Patient name: Shital KHANH Jeramie  Additional names:  n/a

## 2025-06-23 PROBLEM — R79.0 LOW SERUM FERRITIN LEVEL: Status: ACTIVE | Noted: 2025-06-23

## 2025-07-15 ENCOUNTER — PATIENT MESSAGE (OUTPATIENT)
Dept: INTERNAL MEDICINE CLINIC | Facility: CLINIC | Age: 35
End: 2025-07-15

## 2025-07-16 ENCOUNTER — MED REC SCAN ONLY (OUTPATIENT)
Dept: INTERNAL MEDICINE CLINIC | Facility: CLINIC | Age: 35
End: 2025-07-16

## 2025-07-18 ENCOUNTER — TELEPHONE (OUTPATIENT)
Dept: INTERNAL MEDICINE CLINIC | Facility: CLINIC | Age: 35
End: 2025-07-18

## 2025-08-04 ENCOUNTER — TELEPHONE (OUTPATIENT)
Dept: INTERNAL MEDICINE CLINIC | Facility: CLINIC | Age: 35
End: 2025-08-04

## 2025-08-04 DIAGNOSIS — R76.12 POSITIVE QUANTIFERON-TB GOLD TEST: Primary | ICD-10-CM

## 2025-08-05 ENCOUNTER — PATIENT MESSAGE (OUTPATIENT)
Dept: INTERNAL MEDICINE CLINIC | Facility: CLINIC | Age: 35
End: 2025-08-05

## 2025-08-05 DIAGNOSIS — R76.12 POSITIVE QUANTIFERON-TB GOLD TEST: Primary | ICD-10-CM

## (undated) NOTE — LETTER
1/3/2022              [address] [place] [address]         Dear Meggan Díaz,    This letter is to inform you that our office has made several attempts to reach you by phone without success.   We were attempti